# Patient Record
Sex: MALE | Race: WHITE
[De-identification: names, ages, dates, MRNs, and addresses within clinical notes are randomized per-mention and may not be internally consistent; named-entity substitution may affect disease eponyms.]

---

## 2021-09-27 ENCOUNTER — HOSPITAL ENCOUNTER (OUTPATIENT)
Dept: HOSPITAL 63 - US | Age: 51
End: 2021-09-27
Attending: NURSE PRACTITIONER
Payer: OTHER GOVERNMENT

## 2021-09-27 DIAGNOSIS — I82.402: Primary | ICD-10-CM

## 2021-09-27 DIAGNOSIS — M79.662: ICD-10-CM

## 2021-09-27 PROCEDURE — 93971 EXTREMITY STUDY: CPT

## 2021-09-27 NOTE — RAD
Left lower extremity venous duplex study 



Clinical History: Lower extremity pain



Technique: Using a combination of real time ultrasound imaging and color-flow and pulse Doppler imagi
ng techniques, including spectral analysis, graded compression and augmentation, duplex evaluation of
 the deep venous system of the left lower extremity was performed. Multiple images were obtained.



Findings: There is no sonographic evidence of deep venous thrombosis involving the visualized deep ve
nous structures of the left lower extremity. Small fluid collection noted in the medial posterior lef
t calf measuring 7.4 x 6.9 x 0 point centimeters.  



Impression: 



1.No evidence of deep venous thrombosis involving the left lower extremity 



2. Small fluid collection in the subcutaneous tissues of the medial posterior calf measuring 7.4 x 6.
9 x 0 point centimeters. Findings could represent small resolving hematoma, seroma, or less likely ab
scess.



Electronically signed by: Anson Stein MD (9/27/2021 3:27 PM) [id number]

## 2021-09-30 ENCOUNTER — HOSPITAL ENCOUNTER (OUTPATIENT)
Dept: HOSPITAL 61 - MRI | Age: 51
End: 2021-09-30
Attending: STUDENT IN AN ORGANIZED HEALTH CARE EDUCATION/TRAINING PROGRAM
Payer: OTHER GOVERNMENT
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
## 2021-09-30 DIAGNOSIS — S96.812A: Primary | ICD-10-CM
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
## 2021-09-30 PROCEDURE — 73718 MRI LOWER EXTREMITY W/O DYE: CPT

## 2021-09-30 PROCEDURE — 73721 MRI JNT OF LWR EXTRE W/O DYE: CPT

## 2021-10-01 NOTE — RAD
EXAMINATION:  MRI LEFT LOWER EXTREMITY W/O, MRI LEFT LOWER EXTREMITY JOINT WITHOUT



INDICATIONS:  Left ankle and left calf pain and swelling.



TECHNIQUE:  Multiplanar multisequence MRI of the left ankle was obtained without contrast. MRI of the
 left calf was also obtained without contrast.



COMPARISON: None.



FINDINGS:  



LEFT ANKLE:



BONES AND CARTILAGE: No acute fracture. Marrow signal is normal. There is no osteochondral lesion. Ar
ticular cartilage is intact.



LIGAMENTS: Anterior and posterior inferior tibiofibular ligament and interosseous ligament are intact
. Anterior talofibular ligament, calcaneofibular ligament, and posterior talofibular ligament are int
act. Superficial and deep deltoid ligament and spring ligament are intact.



TENDONS: The medial flexor, anterior extensor, and Achilles tendons are intact. Mild peroneus longus 
and brevis tendinopathy without discrete tear.



OTHER: No joint effusion. Plantar fascia is intact. The sinus tarsi and tarsal tunnel are normal. Mus
cles are normal. There is mild subcutaneous edema posteriorly and laterally. Minimal edema and tapers
 fat pad.



LEFT CALF:



No acute fracture or osseous lesion. Marrow signal is normal. There is T2 hypertense fluid with some 
layering blood products extending between the soleus and medial head gastrocnemius muscle. The fluid 
collection/hematoma measures 13 x 8 x 1.5 cm (CC by AP by transverse). There is edema in the proximal
 medial aspect of the soleus muscle. The plantaris tendon is intact. Remaining muscles are intact. Mae
bcutaneous edema is seen along the calf, greatest distally.



IMPRESSION: 

1. Partial tear of the proximal soleus muscle with hematoma extending down the calf between the soleu
s and medial head of gastrocnemius muscles. Findings consistent with "tennis leg".

2. Mild peroneus brevis and longus tendinopathy.







Electronically signed by: Skylar Malave MD (10/1/2021 1:26 PM) [id number]

## 2021-10-01 NOTE — RAD
EXAMINATION:  MRI LEFT LOWER EXTREMITY W/O, MRI LEFT LOWER EXTREMITY JOINT WITHOUT



INDICATIONS:  Left ankle and left calf pain and swelling.



TECHNIQUE:  Multiplanar multisequence MRI of the left ankle was obtained without contrast. MRI of the
 left calf was also obtained without contrast.



COMPARISON: None.



FINDINGS:  



LEFT ANKLE:



BONES AND CARTILAGE: No acute fracture. Marrow signal is normal. There is no osteochondral lesion. Ar
ticular cartilage is intact.



LIGAMENTS: Anterior and posterior inferior tibiofibular ligament and interosseous ligament are intact
. Anterior talofibular ligament, calcaneofibular ligament, and posterior talofibular ligament are int
act. Superficial and deep deltoid ligament and spring ligament are intact.



TENDONS: The medial flexor, anterior extensor, and Achilles tendons are intact. Mild peroneus longus 
and brevis tendinopathy without discrete tear.



OTHER: No joint effusion. Plantar fascia is intact. The sinus tarsi and tarsal tunnel are normal. Mus
cles are normal. There is mild subcutaneous edema posteriorly and laterally. Minimal edema and tapers
 fat pad.



LEFT CALF:



No acute fracture or osseous lesion. Marrow signal is normal. There is T2 hypertense fluid with some 
layering blood products extending between the soleus and medial head gastrocnemius muscle. The fluid 
collection/hematoma measures 13 x 8 x 1.5 cm (CC by AP by transverse). There is edema in the proximal
 medial aspect of the soleus muscle. The plantaris tendon is intact. Remaining muscles are intact. Mae
bcutaneous edema is seen along the calf, greatest distally.



IMPRESSION: 

1. Partial tear of the proximal soleus muscle with hematoma extending down the calf between the soleu
s and medial head of gastrocnemius muscles. Findings consistent with "tennis leg".

2. Mild peroneus brevis and longus tendinopathy.







Electronically signed by: Skylar Malave MD (10/1/2021 1:26 PM) [id number]

## 2022-01-10 ENCOUNTER — HOSPITAL ENCOUNTER (OUTPATIENT)
Dept: HOSPITAL 63 - CT | Age: 52
End: 2022-01-10
Payer: OTHER GOVERNMENT

## 2022-01-10 DIAGNOSIS — J98.11: ICD-10-CM

## 2022-01-10 DIAGNOSIS — I71.2: Primary | ICD-10-CM

## 2022-01-10 PROCEDURE — 71275 CT ANGIOGRAPHY CHEST: CPT

## 2022-01-10 NOTE — RAD
EXAM: CT angiography of the chest with intravenous contrast.



HISTORY: Enlarged aorta.



TECHNIQUE: Computed tomographic images of the chest were obtained following the administration of int
ravenous contrast according to angiography protocol. Multiplanar reformatting was performed and three
 dimensional maximum intensity projection images were obtained.



*One or more of the following individualized dose reduction techniques were utilized for this examina
tion:  

1. Automated exposure control.  

2. Adjustment of the mA and/or kV according to patient size.  

3. Use of iterative reconstruction technique.



COMPARISON: None.



FINDINGS: There is aneurysmal dilatation of the ascending aorta to a caliber of 4.2 cm. There is no e
vidence of aortic dissection. There is a prominent bronchial artery arising from the medial wall of t
he junction of the distal aortic arch and proximal descending aorta. This can be a normal variant. Th
ere is an otherwise standard aortic arch branching pattern.



There is no lymphadenopathy. There are a few calcified granulomas. There is no pneumothorax or pleura
l effusion. There is bilateral posterior dependent atelectasis. There is no suspicious pulmonary nodu
le. There is no acute finding involving the upper abdomen. There is no acute or suspicious osseous fi
nding.



IMPRESSION: 

1. Mildly aneurysmal ascending aorta measuring 2.4 cm.

2. Prominent bronchial artery arising from the junction of the distal aortic arch and proximal descen
ding aorta. There is no convincing underlying etiology such as bronchiectasis, chronic inflammation o
r pulmonary artery stenosis. This can be a normal variant of no clinical significance.

3. No acute pulmonary finding.



Electronically signed by: Feli Trujillo MD (1/10/2022 11:01 AM) [id number]

## 2022-03-02 ENCOUNTER — HOSPITAL ENCOUNTER (OUTPATIENT)
Dept: HOSPITAL 61 - KCIC MRI | Age: 52
End: 2022-03-02
Attending: STUDENT IN AN ORGANIZED HEALTH CARE EDUCATION/TRAINING PROGRAM
Payer: OTHER GOVERNMENT

## 2022-03-02 DIAGNOSIS — M25.812: ICD-10-CM

## 2022-03-02 DIAGNOSIS — M47.816: ICD-10-CM

## 2022-03-02 DIAGNOSIS — M19.012: ICD-10-CM

## 2022-03-02 DIAGNOSIS — M25.721: ICD-10-CM

## 2022-03-02 DIAGNOSIS — M51.27: ICD-10-CM

## 2022-03-02 DIAGNOSIS — M51.37: ICD-10-CM

## 2022-03-02 DIAGNOSIS — M25.421: ICD-10-CM

## 2022-03-02 DIAGNOSIS — M48.061: ICD-10-CM

## 2022-03-02 DIAGNOSIS — M51.86: ICD-10-CM

## 2022-03-02 DIAGNOSIS — M19.021: Primary | ICD-10-CM

## 2022-03-02 PROCEDURE — 72148 MRI LUMBAR SPINE W/O DYE: CPT

## 2022-03-02 PROCEDURE — 73221 MRI JOINT UPR EXTREM W/O DYE: CPT

## 2022-03-02 NOTE — KCIC
MR lumbar spine without contrast



HISTORY: Lumbago. Chronic low back pain. New onset right-sided sciatica.



COMPARISON: No priors



FINDINGS: Well-formed lumbosacral junction the most inferior lumbar type vertebra classified as L5. L
umbar vertebral body height and alignment is intact. No bone marrow edema of the lumbar spine. Conus 
terminates at the upper L1 lumbar vertebral level which is normal. The cauda equina is normal. Parasp
inal tissues are normal. There is degenerative disc desiccation throughout the lumbar spine and lower
 thoracic spine. Disc disease is described in detail below.



L1-L2: Minimal bulging disc annulus. No spinal canal or neural foraminal stenosis.



L2-L3: Mild disc bulge, there is a superimposed shallow left lateral broad-based disc protrusion. Sli
ght deformity of the ventral dural sac by the disc bulge at the lateral recesses without significant 
stenosis and no evidence of nerve root impingement. No spinal canal stenosis dural sac diameter is 13
 mm. Right neural foramen is patent. There is mild to moderate left neural foraminal stenosis by the 
lateral disc protrusion which may likely contact to the exiting left L2 nerve.



L3-L4: Mild disc height loss. Mild disc bulge, with superimposed shallow left lateral broad-based dis
c protrusion. Disc bulge mildly deforms the ventral dural sac and slightly narrows the right lateral 
recess without significant stenosis and no evidence of nerve root impingement. No spinal canal stenos
is or neural sac diameter is 13 mm. Mild right neural foraminal stenosis. There is mild left neural f
oraminal stenosis by the lateral disc protrusion which could lightly contact the exiting left L3 nerv
e.



L4-L5: Mild facet hypertrophy and spurring. Mild disc bulge, superimposed left lateral disc annulus t
ear and shallow left lateral broad-based disc protrusion. Mild narrowing of the left lateral recess w
ithout evidence of impingement of the descending L5 nerve roots. No spinal canal stenosis dural sac d
iameter is 13 mm. Mild right neural foraminal stenosis. There is mild to moderate left neural foramin
al stenosis.



L5-S1: Shallow disc bulge, there is a left paracentral disc annulus tear. No spinal canal stenosis. N
o neural foraminal stenosis.



IMPRESSION: Lumbar disc disease and facet arthrosis. No spinal canal stenosis. Lateral disc protrusio
ns along with facet arthrosis contributes to mild to moderate left neural foraminal stenoses at L2-L3
, L3-L4 and L4-L5. There are mild right-sided neural foraminal stenoses at L3-L4 and L4-L5. Mild narr
owing of the lateral recesses at L3-L4 and L4-L5. See above.



Electronically signed by: René Haji MD (3/2/2022 11:11 AM) [facility] Center[facility]IA LUISA

## 2022-03-02 NOTE — KCIC
Examination: MRI of the right elbow without contrast



HISTORY: History of right elbow pain and limited range of motion within the medial aspect



COMPARISON: None available 



Technique: Multiplanar, multisequence MR imaging of the right elbow was performed without contrast.



Findings:



 The attachment of the triceps tendon to the olecranon processes grossly appears intact. Mild increas
ed T2 signal identified at the attachment of the common flexor tendon to the medial epicondyle could 
be mild epicondylitis.



There is increased T2 signal identified at the common extensor tendon to the lateral epicondyle likel
y tendinosis.

The ulnar collateral ligament is intact. Lateral collateral ligament, radial collateral ligament appe
ars intact. Examination limited due to motion artifact. Small elbow joint effusion identified



The attachment of the biceps tendon, brachialis tendon grossly appears intact.



Moderate degenerative changes elbow joint with small osteophyte formation.



IMPRESSION:



1.  Mild increased T2 signal identified at the attachment of the common flexor tendon to the medial e
picondyle could be mild epicondylitis.

2.  Increased T2 signal identified at the common extensor tendon to the lateral epicondyle likely ten
dinosis.

3.  Moderate degenerative changes elbow joint. Small elbow joint effusion.



Electronically signed by: Aristides Zepeda MD (3/2/2022 11:22 AM) [id number]

## 2022-03-02 NOTE — KCIC
Examination: MRI of the left shoulder without contrast



HISTORY: History of left shoulder pain



COMPARISON: None available



TECHNIQUE: Multiplanar, multisequence MR imaging of the left shoulder performed without contrast



FINDINGS:





 The long head of the biceps tendon within the bicipital groove. The attachment of the long head the 
biceps tendon to the superior labral anchor grossly appears intact. The attachment of the subscapular
is tendon grossly appears intact. There is moderate increased T2 signal identified in the supraspinat
us tendon likely tendinosis. Minimal amount of fluid identified in the subacromial subdeltoid bursa. 
The acromion is type II. The inferior aspect of the acromion abuts the superior aspect of the suprasp
inatus tendon.. Severe degenerative changes acromioclavicular joint.There is mild increased T2 signal
 identified in the superior labrum extending posteriorly.



Mild degenerative changes glenohumeral joint.



The muscle bulk grossly appears unremarkable



There is obscuration of fat in rotator interval..



IMPRESSION:



1.  Moderate increased T2 signal identified in the supraspinatus tendon likely tendinosis.

2.  The inferior aspect of the acromion abuts the superior aspect of the supraspinatus tendon. Correl
ate for impingement. Small amount of fluid identified in the subacromial subdeltoid bursa likely burs
itis.

3.  Mild increased T2 signal identified in the superior labrum extending posteriorly could be a small
 SLAP tear.

4.  Severe degenerative changes acromioclavicular joint.

5.  There is obscuration of fat in rotator interval. Correlate for adhesive capsulitis.



Electronically signed by: Aristides Zepeda MD (3/2/2022 10:51 AM) [id number]

## 2022-03-15 ENCOUNTER — HOSPITAL ENCOUNTER (OUTPATIENT)
Dept: HOSPITAL 61 - KCIC MRI | Age: 52
End: 2022-03-15
Attending: STUDENT IN AN ORGANIZED HEALTH CARE EDUCATION/TRAINING PROGRAM
Payer: OTHER GOVERNMENT

## 2022-03-15 DIAGNOSIS — M47.812: Primary | ICD-10-CM

## 2022-03-15 DIAGNOSIS — M48.8X2: ICD-10-CM

## 2022-03-15 DIAGNOSIS — M50.31: ICD-10-CM

## 2022-03-15 DIAGNOSIS — M48.02: ICD-10-CM

## 2022-03-15 PROCEDURE — 72141 MRI NECK SPINE W/O DYE: CPT

## 2022-03-15 NOTE — KCIC
EXAM: Cervical spine MRI without contrast.



HISTORY: Pain.



TECHNIQUE: Multiplanar, multisequence magnetic resonance imaging of the cervical spine was performed 
without contrast.



COMPARISON: None.



FINDINGS: There is minimal multilevel degenerative listhesis. There is degenerative endplate remodeli
ng and osteophytosis at multiple levels. There is disc space narrowing at C5-C6 and C6-C7. There is n
o suspicious osseous lesion. There is no acute or subacute fracture. No cervical spinal cord lesion i
s seen. The skull base is unremarkable.



At C2-C3, there is a left lateral recess disc protrusion superimposed on a disc bulge and endplate re
modeling. There is mild left foraminal stenosis.



At C3-C4, there is right posterior lateral predominant endplate remodeling. There is severe right and
 mild left facet arthropathy. There is right uncovertebral arthropathy. There is moderate to severe r
ight foraminal stenosis.



At C4-C5, there is a disc bulge and endplate remodeling. There is mild to moderate bilateral facet ar
thropathy. There is bilateral uncovertebral arthropathy. There is mild right and moderate left forami
nal stenosis.



At C5-C6, there are left greater than right posterior lateral disc osteophyte complexes. There is mil
d right and mild to moderate left facet arthropathy. There is left greater than right uncovertebral a
rthropathy. There is mild right and severe left foraminal stenosis.



At C6-C7, there is a disc bulge and endplate remodeling. There is bilateral uncovertebral arthropathy
. There is mild bilateral foraminal stenosis.



IMPRESSION: Multiple level degenerative change involving the cervical spine, described in detail abov
e. This is associated with foraminal stenosis of aforementioned levels. The right foraminal stenosis 
most significant at C3-C4. The left foraminal stenosis is most significant at C5-C6.



Electronically signed by: Barbra Azul MD (3/15/2022 1:22 PM) [id number]

## 2022-03-18 ENCOUNTER — HOSPITAL ENCOUNTER (OUTPATIENT)
Dept: HOSPITAL 61 - ENDOS | Age: 52
Discharge: HOME | End: 2022-03-18
Attending: INTERNAL MEDICINE
Payer: OTHER GOVERNMENT

## 2022-03-18 VITALS — DIASTOLIC BLOOD PRESSURE: 76 MMHG | SYSTOLIC BLOOD PRESSURE: 117 MMHG

## 2022-03-18 VITALS
SYSTOLIC BLOOD PRESSURE: 136 MMHG | DIASTOLIC BLOOD PRESSURE: 79 MMHG | SYSTOLIC BLOOD PRESSURE: 136 MMHG | DIASTOLIC BLOOD PRESSURE: 79 MMHG

## 2022-03-18 VITALS — HEIGHT: 70 IN | BODY MASS INDEX: 28.41 KG/M2 | WEIGHT: 198.42 LBS

## 2022-03-18 DIAGNOSIS — E78.5: ICD-10-CM

## 2022-03-18 DIAGNOSIS — Z79.899: ICD-10-CM

## 2022-03-18 DIAGNOSIS — Z72.89: ICD-10-CM

## 2022-03-18 DIAGNOSIS — K64.0: ICD-10-CM

## 2022-03-18 DIAGNOSIS — Z12.11: Primary | ICD-10-CM

## 2022-03-18 DIAGNOSIS — I10: ICD-10-CM

## 2022-03-18 DIAGNOSIS — G47.30: ICD-10-CM

## 2022-03-18 DIAGNOSIS — K63.89: ICD-10-CM

## 2022-03-18 DIAGNOSIS — Z98.890: ICD-10-CM

## 2022-03-18 DIAGNOSIS — K57.30: ICD-10-CM

## 2022-03-18 PROCEDURE — 45378 DIAGNOSTIC COLONOSCOPY: CPT

## 2022-04-19 ENCOUNTER — HOSPITAL ENCOUNTER (OUTPATIENT)
Dept: HOSPITAL 61 - KCIC MRI | Age: 52
End: 2022-04-19
Attending: NURSE PRACTITIONER
Payer: OTHER GOVERNMENT

## 2022-04-19 DIAGNOSIS — M16.12: Primary | ICD-10-CM

## 2022-04-19 PROCEDURE — 73721 MRI JNT OF LWR EXTRE W/O DYE: CPT
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
